# Patient Record
Sex: FEMALE | Race: BLACK OR AFRICAN AMERICAN
[De-identification: names, ages, dates, MRNs, and addresses within clinical notes are randomized per-mention and may not be internally consistent; named-entity substitution may affect disease eponyms.]

---

## 2019-01-03 NOTE — NUR
ED Nurse Note: 



Pt discharge instruction provided w/ prescription, id band removed, pt 
education done via discussion and handout, pt verbalized understanding and 
agrees with plan, pt advised to follow up with pcp regarding pt's condition, pt 
advised to return to ed if s/s worsen or new s/s develop.

## 2019-01-03 NOTE — EMERGENCY ROOM REPORT
History of Present Illness


General


Chief Complaint:  Toothache


Source:  Patient





Present Illness


HPI


23-year-old female presents to the emergency department complaining of 

progressive left upper molar pain at this now 10 out of 10 in severity 1.5 

weeks.  Patient states that she was supposed to have the tooth pulled however 

she wanted to try to save the tooth get a root canal instead but was unable to 

have this performed.  Patient states that she left work early because the pain 

was too bad today.  Patient reports talking exacerbates her pain eating or even 

having the cold air flow on or across the tooth causes significant pain.  She 

denies discharge, swelling of the gums, fevers, chills or swollen tender lymph 

nodes.


Allergies:  


Coded Allergies:  


     No Known Allergies (Unverified , 11/26/18)





Patient History


Past Medical History:  see triage record


Past Surgical History:  none


Pertinent Family History:  none


Last Menstrual Period:  12/27/18


Pregnant Now:  No


Reviewed Nursing Documentation:  PMH: Agreed; PSxH: Agreed





Nursing Documentation-PMH


Past Medical History:  No History, Except For


Hx Cardiac Problems:  No - TB


Hx Asthma:  Yes


Hx Diabetes:  Yes


Hx Seizures:  Yes





Review of Systems


All Other Systems:  negative except mentioned in HPI





Physical Exam





Vital Signs








  Date Time  Temp Pulse Resp B/P (MAP) Pulse Ox O2 Delivery O2 Flow Rate FiO2


 


1/3/19 15:01 98.4 78 20 121/79 98 Room Air  








Sp02 EP Interpretation:  reviewed, normal


General Appearance:  no apparent distress, alert, GCS 15, non-toxic


Head:  normocephalic, atraumatic


Eyes:  bilateral eye normal inspection, bilateral eye PERRL


ENT:  hearing grossly normal, normal voice, other - tenderness to percussion to 

tooth no. 15, no fluctuance about the gum line, some erythema noted surrounding 

the base of the tooth.


Neck:  full range of motion


Respiratory:  lungs clear, speaking full sentences


Cardiovascular #1:  regular rate, rhythm


Musculoskeletal:  back normal, gait/station normal, normal range of motion, non-

tender


Neurologic:  alert, oriented x3, responsive, motor strength/tone normal, 

sensory intact, normal gait, speech normal, grossly normal


Psychiatric:  judgement/insight normal


Skin:  normal color, no rash, warm/dry, well hydrated


Lymphatic:  no adenopathy





Medical Decision Making


PA Attestation


Dr. Mcnulty is my supervising Physician whom patient management has been 

discussed with.


Diagnostic Impression:  


 Primary Impression:  


 Acute pericoronitis


ER Course


23-year-old female presents to the emergency department complaining of 

progressive left upper molar pain at this now 10 out of 10 in severity 1.5 

weeks.  Patient states that she was supposed to have the tooth pulled however 

she wanted to try to save the tooth get a root canal instead but was unable to 

have this performed.  Patient states that she left work early because the pain 

was too bad today.  Patient reports talking exacerbates her pain eating or even 

having the cold air flow on or across the tooth causes significant pain.  She 

denies discharge, swelling of the gums, fevers, chills or swollen tender lymph 

nodes.








Ddx considered but are not limited to cellulitis, dental abscess, orbital 

cellulitis, d/l tooth, dental pain. trigeminal neuralgia


Vital signs: are WNL, pt. is afebrile


H&PE are most consistent with dental abscess. 


ORDERS: none required at this time, the diagnosis is clinical





ED INTERVENTIONS: 


-Norco PO





DISCHARGE: At this time pt. is stable for d/c to home. Will provide printed 

patient care instructions, and any necessary prescriptions. Care plan and 

follow up instructions have been discussed with the patient prior to discharge.





Last Vital Signs








  Date Time  Temp Pulse Resp B/P (MAP) Pulse Ox O2 Delivery O2 Flow Rate FiO2


 


1/3/19 15:09 98.4 78 20 121/79 98 Room Air  








Disposition:  HOME, SELF-CARE


Condition:  Stable


Scripts


Acetaminophen With Codeine (T#3) (TYLENOL #3 TAB*) Y Tab


1 TAB ORAL Q6H PRN for For Pain, #8 TAB


   Prov: Samantha Dugan         1/3/19 


Amoxicillin* (AMOXIL*) 500 Mg Capsule


500 MG ORAL BID for 7 Days, #14 CAP


   Prov: Samantha Dugan         1/3/19


Departure Forms:  Return to Work      Return to Work Date:  Jan 6, 2019


   Work Restrictions:  None


      Return to Full Activity:  Jan 7, 2019


Patient Instructions:  Dental Pain





Additional Instructions:  


Take medications as directed. 


 ** Follow up with a Dentist in 3-5 days, even if your symptoms have resolved. *

* 


--Please review list of Dental clinics, if you do not already have a Dentist





Return sooner to ED if new symptoms occur, or current symptoms become worse. 


Do not drink alcohol, drive, or operate heavy machinery while taking Tylenol # 

3 as this may cause drowsiness. 











- Please note that this Emergency Department Report was dictated using [employer] technology software, occasionally this can lead to 

erroneous entry secondary to interpretation by the dictation equipment.











Samantha Dugan Dony 3, 2019 15:23

## 2019-01-03 NOTE — NUR
ED Nurse Note:



Provider notified regarding pt's driving situation. 

-------------------------------------------------------------------------------

Addendum: 01/03/19 at 1537 by [email or web address]

-------------------------------------------------------------------------------

addendum: 



norco was held.

## 2019-01-03 NOTE — NUR
ED Nurse Note:



pt walked into ED c/o left upper tooth pain since a week ago and progressively 
worsening. Pt states she wasn't able to see the dentist. Pt rates pain 10/10 
sharp and constant. Pt AA&ox4, gcs=15, skin warm and dry, resp even and 
unlabored, airway patent, ambulates w/ steady gait, noted tenderness noted 
missing tooth on upper left side, and some darkened area on the tooth before 
missing tooth. Will continue to monitor.

## 2019-08-18 NOTE — NUR
ED Nurse Note:



PATIENT WALKED IN TO ER FROM HOME C/O FEVER, MOUTH PAIN, AND HEAD AND NECK PAIN 
AFTER 2 TEETH PULLED 3 DAYS AGO. PAIN AT 10/10 AND SHARP. AAO X4, SKIN IS DRY 
WARM TO TOUCH. ER MD AT BEDSIDE.

## 2019-08-18 NOTE — EMERGENCY ROOM REPORT
History of Present Illness


General


Chief Complaint:  Pain


Source:  Patient





Present Illness


HPI


Presents with sore throat for 1 week.  She is on amoxicillin, Tylenol with 

codeine Motrin aspirin and Tylenol.  She states she cannot swallow.  She had 

teeth pulled and has pain there.  She feels dizzy when she stands up.  She does 

not believe she is pregnant at this time.  The pain is rated 10/10, burning in 

her throat and aching in her mouth which is constant.  No radiation.


Allergies:  


Coded Allergies:  


     No Known Allergies (Unverified , 8/18/19)





Patient History


Past Medical History:  see triage record


Social History:  Reports: smoking


Social History Narrative


From home - working


Last Menstrual Period:  07-


Pregnant Now:  No


Reviewed Nursing Documentation:  PMH: Agreed; PSxH: Agreed





Nursing Documentation-PMH


Hx Cardiac Problems:  No - TB


Hx Asthma:  Yes


Hx Diabetes:  Yes


Hx Seizures:  Yes





Review of Systems


Constitutional:  Reports: see HPI


ENT:  Reports: see HPI


Respiratory:  Denies: cough


Cardiovascular:  Denies: chest pain


Gastrointestinal:  Denies: diarrhea, nausea, vomiting


Genitourinary:  Reports: see HPI


Musculoskeletal:  Reports: see HPI


Skin:  Denies: rash


Neurological:  Reports: see HPI; Denies: headache





Physical Exam





Vital Signs








  Date Time  Temp Pulse Resp B/P (MAP) Pulse Ox O2 Delivery O2 Flow Rate FiO2


 


8/18/19 23:01 98.2 80 20 152/85 (107) 98 Room Air  








Sp02 EP Interpretation:  reviewed, normal


General Appearance:  well appearing, no apparent distress, GCS 15


Head:  normocephalic


Eyes:  bilateral eye normal inspection, bilateral eye PERRL, bilateral eye EOMI


ENT:  no angioedema, moist mucus membranes, tonsillar swelling - No 

peritonsillar abscess able to open mouth without difficulty., pharyngeal 

erythema


Neck:  supple


Respiratory:  lungs clear, normal breath sounds


Cardiovascular #1:  regular rate, rhythm


Cardiovascular #2:  2+ radial (R)


Gastrointestinal:  normal inspection, normal bowel sounds, non tender, non-

distended


Genitourinary:  no CVA tenderness


Musculoskeletal:  back normal, gait/station normal, normal range of motion


Neurologic:  alert, oriented x3, grossly normal


Psychiatric:  mood/affect normal


Skin:  no rash, other - Tattoos





Medical Decision Making


Diagnostic Impression:  


 Primary Impression:  


 Pharyngitis


 Qualified Codes:  J02.9 - Acute pharyngitis, unspecified


 Additional Impression:  


 Dehydration


ER Course


Patient presents with a week of sore throat undergoing treatment with 

amoxicillin, Tylenol codeine and Motrin.  She has symptoms of dehydration.  

Differential includes partially treated strep infection, mononucleosis, viral 

pharyngitis amongst others.  There is no evidence of peritonsillar abscess at 

this time.  Patient will be treated with IV hydration, steroids, analgesia and 

antibiotics.





Patient is improved with treatment.





Discussed treatment plan with patient.  Patient stable for outpatient 

observation and treatment.





Laboratory Tests








Test


  8/18/19


23:25


 


White Blood Count


  8.6 K/UL


(4.8-10.8)


 


Red Blood Count


  4.64 M/UL


(4.20-5.40)


 


Hemoglobin


  13.8 G/DL


(12.0-16.0)


 


Hematocrit


  41.2 %


(37.0-47.0)


 


Mean Corpuscular Volume 89 FL (80-99)  


 


Mean Corpuscular Hemoglobin


  29.7 PG


(27.0-31.0)


 


Mean Corpuscular Hemoglobin


Concent 33.4 G/DL


(32.0-36.0)


 


Red Cell Distribution Width


  12.2 %


(11.6-14.8)


 


Platelet Count


  219 K/UL


(150-450)


 


Mean Platelet Volume


  9.4 FL


(6.5-10.1)


 


Neutrophils (%) (Auto)


  51.7 %


(45.0-75.0)


 


Lymphocytes (%) (Auto)


  36.2 %


(20.0-45.0)


 


Monocytes (%) (Auto)


  9.6 %


(1.0-10.0)


 


Eosinophils (%) (Auto)


  1.3 %


(0.0-3.0)


 


Basophils (%) (Auto)


  1.2 %


(0.0-2.0)


 


Urine Color Pale yellow  


 


Urine Appearance Clear  


 


Urine pH 6 (4.5-8.0)  


 


Urine Specific Gravity


  1.015


(1.005-1.035)


 


Urine Protein


  Negative


(NEGATIVE)


 


Urine Glucose (UA)


  4+ (NEGATIVE)


H


 


Urine Ketones


  Negative


(NEGATIVE)


 


Urine Blood


  Negative


(NEGATIVE)


 


Urine Nitrite


  Negative


(NEGATIVE)


 


Urine Bilirubin


  Negative


(NEGATIVE)


 


Urine Urobilinogen


  Normal MG/DL


(0.0-1.0)


 


Urine Leukocyte Esterase


  Negative


(NEGATIVE)


 


Urine HCG, Qualitative


  Negative


(NEGATIVE)


 


Sodium Level


  136 MMOL/L


(136-145)


 


Potassium Level


  3.3 MMOL/L


(3.5-5.1)  L


 


Chloride Level


  103 MMOL/L


()


 


Carbon Dioxide Level


  27 MMOL/L


(21-32)


 


Anion Gap


  6 mmol/L


(5-15)


 


Blood Urea Nitrogen


  8 mg/dL (7-18)


 


 


Creatinine


  0.6 MG/DL


(0.55-1.30)


 


Estimate Glomerular


Filtration Rate > 60 mL/min


(>60)


 


Glucose Level


  224 MG/DL


()  H


 


Calcium Level


  8.6 MG/DL


(8.5-10.1)


 


Total Bilirubin


  0.2 MG/DL


(0.2-1.0)


 


Aspartate Amino Transferase


(AST) 32 U/L (15-37)


 


 


Alanine Aminotransferase (ALT)


  43 U/L (12-78)


 


 


Alkaline Phosphatase


  82 U/L


()


 


Total Protein


  6.2 G/DL


(6.4-8.2)  L


 


Albumin


  2.8 G/DL


(3.4-5.0)  L


 


Globulin 3.4 g/dL  


 


Albumin/Globulin Ratio


  0.8 (1.0-2.7)


L











Last Vital Signs








  Date Time  Temp Pulse Resp B/P (MAP) Pulse Ox O2 Delivery O2 Flow Rate FiO2


 


8/19/19 01:25 98.4 81 16 138/76 99 Room Air  








Status:  improved


Disposition:  HOME, SELF-CARE


Condition:  Improved


Scripts


Lidocaine HCl 2% Viscous (Lidocaine HCl 2% Viscous) 100 Ml Solution


10 ML ORAL QID PRN for sore throat, #60 ML


   Prov: Scooby Mcnulty MD         8/19/19 


Tramadol Hcl* (ULTRAM*) 50 Mg Tablet


50 MG ORAL Q6H PRN for For Pain, #8 TAB 0 Refills


   Prov: Scooby Mcnulty MD         8/19/19 


Azithromycin* (ZITHROMAX*) 250 Mg Tablet


250 MG ORAL DAILY, #4 TAB


   Prov: Scooby Mcnulty MD         8/19/19











Scooby Mcnulty MD Aug 18, 2019 23:22

## 2019-08-19 NOTE — NUR
ED Nurse Note:



PT BEING D/C TO HOME, PT IS AWAKE, ALERT AND ORIENTED X 4, AMBULATORY, IV 
REMOVED WITHOUT COMPLICATIONS AND NO BLEEDING NOTED, PT GIVEN F/U INFO AND 
AFTER CARE INSTRUCTIONS AND RE-VERBALIZES PROPER MEDICATION ADMINISTRATION, NAD 
NOTED DURING D/C TO HOME.

## 2019-10-23 ENCOUNTER — HOSPITAL ENCOUNTER (EMERGENCY)
Dept: HOSPITAL 72 - EMR | Age: 24
Discharge: HOME | End: 2019-10-23
Payer: MEDICAID

## 2019-10-23 VITALS — SYSTOLIC BLOOD PRESSURE: 139 MMHG | DIASTOLIC BLOOD PRESSURE: 78 MMHG

## 2019-10-23 VITALS — DIASTOLIC BLOOD PRESSURE: 84 MMHG | SYSTOLIC BLOOD PRESSURE: 145 MMHG

## 2019-10-23 VITALS — HEIGHT: 64 IN | BODY MASS INDEX: 29.71 KG/M2 | WEIGHT: 174 LBS
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
## 2019-10-23 DIAGNOSIS — E11.9: ICD-10-CM

## 2019-10-23 DIAGNOSIS — J45.909: ICD-10-CM
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
## 2019-10-23 DIAGNOSIS — S81.812A: Primary | ICD-10-CM

## 2019-10-23 DIAGNOSIS — Z23: ICD-10-CM

## 2019-10-23 PROCEDURE — 90715 TDAP VACCINE 7 YRS/> IM: CPT

## 2019-10-23 PROCEDURE — 90471 IMMUNIZATION ADMIN: CPT

## 2019-10-23 PROCEDURE — 99283 EMERGENCY DEPT VISIT LOW MDM: CPT

## 2019-10-23 NOTE — NUR
ED Nurse Note:



LLE WOUND CLEANSED WITH NS, PATTED DRY THEN APPLIED MUPIROCIN OINTMENT,COVERE 
DWITH XEROFORM AND PETROLATUM.

## 2019-10-23 NOTE — EMERGENCY ROOM REPORT
History of Present Illness


General


Chief Complaint:  Laceration


Source:  Patient





Present Illness


HPI


25 YO Female presents to the emergency department complaining of 10 out of 10 

in severity left medial leg pain since yesterday status post laceration from a 

piece of metal on her shoe rack.  Patient reports she is not sure when her last 

tetanus vaccination was and she states that she is not taking any blood 

thinning medications.  Patient reports history of diabetes and states that she 

is concerned because she may have poor healing process.  She denies bleeding at 

this time she reports pain exacerbation upon walking.  She denies trauma or 

fall.


Allergies:  


Coded Allergies:  


     No Known Allergies (Unverified , 8/18/19)





Patient History


Past Medical History:  see triage record


Past Surgical History:  none


Pertinent Family History:  none


Last Menstrual Period:  9/30/19


Pregnant Now:  No


Reviewed Nursing Documentation:  PMH: Agreed; PSxH: Agreed





Nursing Documentation-PMH


Past Medical History:  No History, Except For


Hx Cardiac Problems:  No - TB


Hx Asthma:  Yes


Hx Diabetes:  Yes


Hx Seizures:  Yes





Review of Systems


All Other Systems:  negative except mentioned in HPI





Physical Exam





Vital Signs








  Date Time  Temp Pulse Resp B/P (MAP) Pulse Ox O2 Delivery O2 Flow Rate FiO2


 


10/23/19 16:16 98.2 83 16 145/84 (104) 100 Room Air  








Sp02 EP Interpretation:  reviewed, normal


General Appearance:  no apparent distress, alert, GCS 15, non-toxic


Head:  normocephalic, atraumatic


Eyes:  bilateral eye normal inspection, bilateral eye PERRL


ENT:  hearing grossly normal, normal voice


Neck:  full range of motion


Respiratory:  lungs clear, normal breath sounds, speaking full sentences


Cardiovascular #1:  regular rate, rhythm


Gastrointestinal:  non tender, soft


Musculoskeletal:  gait/station normal, normal range of motion, non-tender


Neurologic:  alert, oriented x3, responsive, motor strength/tone normal, 

sensory intact, speech normal, grossly normal


Psychiatric:  judgement/insight normal


Skin:  laceration - Medial left calf  laceration approx  4 cm in length, 

superficial avulsion.


Lymphatic:  no adenopathy





Medical Decision Making


PA Attestation


Dr. Riley is my supervising Physician whom patient management has been 

discussed with.


Diagnostic Impression:  


 Primary Impression:  


 Laceration


ER Course


25 YO Female presents to the emergency department complaining of 10 out of 10 

in severity left medial leg pain since yesterday status post laceration from a 

piece of metal on her shoe rack.  Patient reports she is not sure when her last 

tetanus vaccination was and she states that she is not taking any blood 

thinning medications.  Patient reports history of diabetes and states that she 

is concerned because she may have poor healing process.  She denies bleeding at 

this time she reports pain exacerbation upon walking.  She denies trauma or 

fall.








Ddx considered but are not limited to laceration, tendon injury, cellulitis, 

amputation





Vital signs: are WNL, pt. is afebrile.





H&PE are most consistent with:  Medial left calf  laceration approx  4 cm in 

length, superficial avulsion.





ORDERS: none required at this time, the diagnosis is clinical





ED INTERVENTIONS: 





-Tetanus vaccine was administered as pt. vaccination status was  unknown.





- The wound was copiously irrigated with normal saline, and explored for 

foreign body for which no FB  was found. 


 


-Bacitracin and sterile dressing is applied.





Discussed with patient:  That we make every effort to approximate the 

laceration as best as we can so that scarring will be as cosmetically pleasing 

as possible with our limited cosmetic skill set in the Emergency dept.  

Regardless of our best efforts there will be scarring after laceration repair.  

The extent of scarring is unknown at this time.  





DISCHARGE: At this time pt. is stable for d/c to home. Will provide printed 

patient care instructions, and any necessary prescriptions. Care plan and 

follow up instructions have been discussed with the patient prior to discharge.





Last Vital Signs








  Date Time  Temp Pulse Resp B/P (MAP) Pulse Ox O2 Delivery O2 Flow Rate FiO2


 


10/23/19 16:16 98.2 83 16 145/84 100 Room Air  








Disposition:  HOME, SELF-CARE


Condition:  Stable


Departure Forms:  Return to Work      Return to Work Date:  Oct 27, 2019


   Work Restrictions:  None


   Other Restrictions:  May return Sooner if Symptoms have resolved. 


   Return to Full Activity:  Oct 27, 2019


Patient Instructions:  Nonsutured Laceration Care





Additional Instructions:  


Take medications as directed. 





 ** Follow up with a Primary Care Provider in 3-5 days, even if your symptoms 

have resolved. ** 





-


Return sooner to ED if new symptoms occur, or current symptoms become worse. 














- Please note that this Emergency Department Report was dictated using [employer] technology software, occasionally this can lead to 

erroneous entry secondary to interpretation by the dictation equipment.











Samantha Dugan Oct 23, 2019 17:21

## 2019-10-23 NOTE — NUR
ER DISCHARGE NOTE:



Pt was seen due to LLE abrasion. Patient is cleared to be discharged per PA, pt 
is aox4, on room air, with stable vital signs. pt was given dc and prescription 
instructions, pt was able to verbalize understanding, pt id band removed. pt is 
able to ambulate with steady gait. pt took all belongings.

## 2021-02-19 ENCOUNTER — HOSPITAL ENCOUNTER (EMERGENCY)
Dept: HOSPITAL 72 - EMR | Age: 26
Discharge: HOME | End: 2021-02-19
Payer: MEDICAID

## 2021-02-19 VITALS — SYSTOLIC BLOOD PRESSURE: 142 MMHG | DIASTOLIC BLOOD PRESSURE: 82 MMHG

## 2021-02-19 VITALS — WEIGHT: 225 LBS | BODY MASS INDEX: 32.21 KG/M2 | HEIGHT: 70 IN

## 2021-02-19 DIAGNOSIS — E11.9: ICD-10-CM

## 2021-02-19 DIAGNOSIS — J45.909: ICD-10-CM

## 2021-02-19 DIAGNOSIS — S29.9XXA: ICD-10-CM
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
## 2021-02-19 DIAGNOSIS — S49.92XA: ICD-10-CM

## 2021-02-19 DIAGNOSIS — S69.92XA: ICD-10-CM
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
## 2021-02-19 DIAGNOSIS — S99.922A: Primary | ICD-10-CM

## 2021-02-19 DIAGNOSIS — G40.909: ICD-10-CM
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
## 2021-02-19 DIAGNOSIS — S99.912A: ICD-10-CM

## 2021-02-19 DIAGNOSIS — S69.91XA: ICD-10-CM

## 2021-02-19 PROCEDURE — 99284 EMERGENCY DEPT VISIT MOD MDM: CPT

## 2021-02-19 PROCEDURE — 73030 X-RAY EXAM OF SHOULDER: CPT

## 2021-02-19 PROCEDURE — 96372 THER/PROPH/DIAG INJ SC/IM: CPT

## 2021-02-19 PROCEDURE — 73130 X-RAY EXAM OF HAND: CPT

## 2021-02-19 PROCEDURE — 71250 CT THORAX DX C-: CPT

## 2021-02-19 PROCEDURE — 73630 X-RAY EXAM OF FOOT: CPT

## 2021-02-19 PROCEDURE — 73610 X-RAY EXAM OF ANKLE: CPT

## 2021-02-19 NOTE — NUR
-------------------------------------------------------------------------------

          *** Note damon in EDM - 02/19/21 at 0216 by ROSALIE ***          

-------------------------------------------------------------------------------

ER DISCHARGE NOTE:

Patient is cleared to be discharged per ERMD, pt is aox4, on room air, with 
stable vital signs. pt was given dc and prescription instructions, pt was able 
to verbalize understanding, pt id band and iv site removed without 
complications. pt is able to ambulate with steady gait. pt took all belongings.
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
## 2021-02-19 NOTE — DIAGNOSTIC IMAGING REPORT
Indication: Pain, trauma

 

Technique: 3 views of the left shoulder

 

Comparison: none

 

Findings: No acute fracture. No dislocation. Joint spaces are preserved

 

Impression: Negative

## 2021-02-19 NOTE — DIAGNOSTIC IMAGING REPORT
EXAM:

  CT Chest Without Intravenous Contrast

 

CLINICAL HISTORY:

  TRAUMA

 

TECHNIQUE:

  Axial computed tomography images of the chest without intravenous 

contrast.  CTDI is 7.9 mGy and DLP is 251.8 mGy-cm.  One or more of the 

following dose reduction techniques were used: automated exposure control,

 adjustment of the mA and/or kV according to patient size, use of 

iterative reconstruction technique.

 

COMPARISON:

  No relevant prior studies available.

 

FINDINGS:

  Lungs:  Unremarkable.  No mass.  No consolidation.

  Pleural space:  Unremarkable.  No pneumothorax.  No significant 

effusion.

  Heart:  Unremarkable.  No cardiomegaly.  No significant pericardial 

effusion.

  Bones/joints:  Unremarkable.  No acute fracture.  No dislocation.

  Soft tissues:  Unremarkable.

  Vasculature:  Unremarkable.  No thoracic aortic aneurysm.

  Lymph nodes:  Unremarkable.  No enlarged lymph nodes.

  Kidneys and ureters:  There is a 2 mm calculus in the left kidney.

 

IMPRESSION:     

  No acute findings.

## 2021-02-19 NOTE — DIAGNOSTIC IMAGING REPORT
Indication: Pain, trauma

 

Technique: 3 views of the left ankle

 

Comparison: none 

 

Findings: No acute fracture. No dislocation. Joint spaces are preserved

 

Impression: Negative

## 2021-02-19 NOTE — DIAGNOSTIC IMAGING REPORT
Indication: Pain, trauma

 

Technique: 3 views left hand

 

Comparison: none

 

Findings: No acute fracture. No dislocation. Joint spaces are preserved

 

Impression: Negative